# Patient Record
Sex: FEMALE | Race: WHITE | URBAN - METROPOLITAN AREA
[De-identification: names, ages, dates, MRNs, and addresses within clinical notes are randomized per-mention and may not be internally consistent; named-entity substitution may affect disease eponyms.]

---

## 2017-11-29 ENCOUNTER — HOSPITAL ENCOUNTER (OUTPATIENT)
Facility: CLINIC | Age: 57
End: 2017-11-29
Attending: OPHTHALMOLOGY | Admitting: OPHTHALMOLOGY
Payer: COMMERCIAL

## 2017-12-19 RX ORDER — PROPARACAINE HYDROCHLORIDE 5 MG/ML
1 SOLUTION/ DROPS OPHTHALMIC ONCE
Status: CANCELLED | OUTPATIENT
Start: 2017-12-19

## 2017-12-19 RX ORDER — MOXIFLOXACIN 5 MG/ML
1 SOLUTION/ DROPS OPHTHALMIC
Status: CANCELLED | OUTPATIENT
Start: 2017-12-19

## 2017-12-19 RX ORDER — DICLOFENAC SODIUM 1 MG/ML
1 SOLUTION/ DROPS OPHTHALMIC
Status: CANCELLED | OUTPATIENT
Start: 2017-12-19

## 2017-12-19 RX ORDER — PILOCARPINE HYDROCHLORIDE 10 MG/ML
1 SOLUTION/ DROPS OPHTHALMIC ONCE
Status: CANCELLED | OUTPATIENT
Start: 2017-12-19

## 2017-12-20 RX ORDER — GLUCOSAMINE/D3/BOSWELLIA SERRA 1500MG-400
1 TABLET ORAL DAILY
COMMUNITY

## 2017-12-20 RX ORDER — SILICONE ADHESIVE 1.5" X 3"
1 SHEET (EA) TOPICAL 4 TIMES DAILY PRN
COMMUNITY

## 2017-12-20 RX ORDER — MENTHOL AND ZINC OXIDE .44; 20.625 G/100G; G/100G
OINTMENT TOPICAL 3 TIMES DAILY
COMMUNITY

## 2017-12-21 ENCOUNTER — SURGERY (OUTPATIENT)
Age: 57
End: 2017-12-21

## 2017-12-21 ENCOUNTER — ANESTHESIA EVENT (OUTPATIENT)
Dept: SURGERY | Facility: CLINIC | Age: 57
End: 2017-12-21
Payer: COMMERCIAL

## 2017-12-21 ENCOUNTER — HOSPITAL ENCOUNTER (OUTPATIENT)
Facility: CLINIC | Age: 57
Discharge: HOME | End: 2017-12-21
Attending: OPHTHALMOLOGY | Admitting: OPHTHALMOLOGY
Payer: COMMERCIAL

## 2017-12-21 ENCOUNTER — ANESTHESIA (OUTPATIENT)
Dept: SURGERY | Facility: CLINIC | Age: 57
End: 2017-12-21
Payer: COMMERCIAL

## 2017-12-21 VITALS
RESPIRATION RATE: 14 BRPM | HEIGHT: 64 IN | DIASTOLIC BLOOD PRESSURE: 77 MMHG | TEMPERATURE: 97.2 F | SYSTOLIC BLOOD PRESSURE: 139 MMHG | OXYGEN SATURATION: 97 % | WEIGHT: 200 LBS

## 2017-12-21 PROCEDURE — 71000028 ZZH EYE RECOVERY PHASE 2 EACH 15 MINS: Performed by: OPHTHALMOLOGY

## 2017-12-21 PROCEDURE — 27210794 ZZH OR GENERAL SUPPLY STERILE: Performed by: OPHTHALMOLOGY

## 2017-12-21 PROCEDURE — 25000128 H RX IP 250 OP 636: Performed by: NURSE ANESTHETIST, CERTIFIED REGISTERED

## 2017-12-21 PROCEDURE — 25000125 ZZHC RX 250: Performed by: ANESTHESIOLOGY

## 2017-12-21 PROCEDURE — 25000128 H RX IP 250 OP 636: Performed by: OPHTHALMOLOGY

## 2017-12-21 PROCEDURE — 36000104 ZZH EYE SURGERY LEVEL 4 1ST 30 MIN: Performed by: OPHTHALMOLOGY

## 2017-12-21 PROCEDURE — 40000170 ZZH STATISTIC PRE-PROCEDURE ASSESSMENT II: Performed by: OPHTHALMOLOGY

## 2017-12-21 PROCEDURE — 25000125 ZZHC RX 250: Performed by: NURSE ANESTHETIST, CERTIFIED REGISTERED

## 2017-12-21 PROCEDURE — V2632 POST CHMBR INTRAOCULAR LENS: HCPCS | Performed by: OPHTHALMOLOGY

## 2017-12-21 PROCEDURE — 37000008 ZZH ANESTHESIA TECHNICAL FEE, 1ST 30 MIN: Performed by: OPHTHALMOLOGY

## 2017-12-21 PROCEDURE — 27210995 ZZH RX 272: Performed by: OPHTHALMOLOGY

## 2017-12-21 PROCEDURE — 36000105 ZZH EYE SURGERY LEVEL 4 EA 15 ADDTL MIN: Performed by: OPHTHALMOLOGY

## 2017-12-21 PROCEDURE — 25000128 H RX IP 250 OP 636: Performed by: ANESTHESIOLOGY

## 2017-12-21 PROCEDURE — V2785 CORNEAL TISSUE PROCESSING: HCPCS | Performed by: OPHTHALMOLOGY

## 2017-12-21 PROCEDURE — 37000009 ZZH ANESTHESIA TECHNICAL FEE, EACH ADDTL 15 MIN: Performed by: OPHTHALMOLOGY

## 2017-12-21 PROCEDURE — 25000125 ZZHC RX 250: Performed by: OPHTHALMOLOGY

## 2017-12-21 DEVICE — EYE CORNEA PROCESS FEE FOR DESCEMENTS MN LIONS EYE BANK: Type: IMPLANTABLE DEVICE | Site: EYE | Status: FUNCTIONAL

## 2017-12-21 DEVICE — EYE IMP IOL AMO PCL TECNIS ZCB00 17.5: Type: IMPLANTABLE DEVICE | Site: EYE | Status: FUNCTIONAL

## 2017-12-21 RX ORDER — MOXIFLOXACIN 5 MG/ML
1 SOLUTION/ DROPS OPHTHALMIC
Status: COMPLETED | OUTPATIENT
Start: 2017-12-21 | End: 2017-12-21

## 2017-12-21 RX ORDER — PROPOFOL 10 MG/ML
INJECTION, EMULSION INTRAVENOUS PRN
Status: DISCONTINUED | OUTPATIENT
Start: 2017-12-21 | End: 2017-12-21

## 2017-12-21 RX ORDER — LIDOCAINE HYDROCHLORIDE 20 MG/ML
INJECTION, SOLUTION INFILTRATION; PERINEURAL PRN
Status: DISCONTINUED | OUTPATIENT
Start: 2017-12-21 | End: 2017-12-21

## 2017-12-21 RX ORDER — TRIAMCINOLONE ACETONIDE 40 MG/ML
INJECTION, SUSPENSION INTRA-ARTICULAR; INTRAMUSCULAR PRN
Status: DISCONTINUED | OUTPATIENT
Start: 2017-12-21 | End: 2017-12-21 | Stop reason: HOSPADM

## 2017-12-21 RX ORDER — TROPICAMIDE 10 MG/ML
1 SOLUTION/ DROPS OPHTHALMIC
Status: COMPLETED | OUTPATIENT
Start: 2017-12-21 | End: 2017-12-21

## 2017-12-21 RX ORDER — SODIUM CHLORIDE, SODIUM LACTATE, POTASSIUM CHLORIDE, CALCIUM CHLORIDE 600; 310; 30; 20 MG/100ML; MG/100ML; MG/100ML; MG/100ML
INJECTION, SOLUTION INTRAVENOUS CONTINUOUS
Status: DISCONTINUED | OUTPATIENT
Start: 2017-12-21 | End: 2017-12-21 | Stop reason: HOSPADM

## 2017-12-21 RX ORDER — DICLOFENAC SODIUM 1 MG/ML
1 SOLUTION/ DROPS OPHTHALMIC
Status: COMPLETED | OUTPATIENT
Start: 2017-12-21 | End: 2017-12-21

## 2017-12-21 RX ORDER — PROPARACAINE HYDROCHLORIDE 5 MG/ML
1 SOLUTION/ DROPS OPHTHALMIC ONCE
Status: COMPLETED | OUTPATIENT
Start: 2017-12-21 | End: 2017-12-21

## 2017-12-21 RX ORDER — PILOCARPINE HYDROCHLORIDE 10 MG/ML
1 SOLUTION/ DROPS OPHTHALMIC ONCE
Status: COMPLETED | OUTPATIENT
Start: 2017-12-21 | End: 2017-12-21

## 2017-12-21 RX ORDER — BALANCED SALT SOLUTION 6.4; .75; .48; .3; 3.9; 1.7 MG/ML; MG/ML; MG/ML; MG/ML; MG/ML; MG/ML
SOLUTION OPHTHALMIC PRN
Status: DISCONTINUED | OUTPATIENT
Start: 2017-12-21 | End: 2017-12-21 | Stop reason: HOSPADM

## 2017-12-21 RX ORDER — PHENYLEPHRINE HYDROCHLORIDE 25 MG/ML
1 SOLUTION/ DROPS OPHTHALMIC
Status: COMPLETED | OUTPATIENT
Start: 2017-12-21 | End: 2017-12-21

## 2017-12-21 RX ORDER — CYCLOPENTOLATE HYDROCHLORIDE 10 MG/ML
1 SOLUTION/ DROPS OPHTHALMIC
Status: COMPLETED | OUTPATIENT
Start: 2017-12-21 | End: 2017-12-21

## 2017-12-21 RX ADMIN — PHENYLEPHRINE HYDROCHLORIDE 1 DROP: 2.5 SOLUTION/ DROPS OPHTHALMIC at 14:27

## 2017-12-21 RX ADMIN — MOXIFLOXACIN 1 DROP: 5 SOLUTION/ DROPS OPHTHALMIC at 13:45

## 2017-12-21 RX ADMIN — CEFTAZIDIME 0.5 ML: 1 INJECTION, POWDER, FOR SOLUTION INTRAMUSCULAR; INTRAVENOUS at 15:48

## 2017-12-21 RX ADMIN — CYCLOPENTOLATE HYDROCHLORIDE 1 DROP: 10 SOLUTION/ DROPS OPHTHALMIC at 14:32

## 2017-12-21 RX ADMIN — MOXIFLOXACIN 1 DROP: 5 SOLUTION/ DROPS OPHTHALMIC at 13:35

## 2017-12-21 RX ADMIN — PROPARACAINE HYDROCHLORIDE 1 DROP: 5 SOLUTION/ DROPS OPHTHALMIC at 13:29

## 2017-12-21 RX ADMIN — LIDOCAINE HYDROCHLORIDE 1 ML: 10 INJECTION, SOLUTION EPIDURAL; INFILTRATION; INTRACAUDAL; PERINEURAL at 13:48

## 2017-12-21 RX ADMIN — PILOCARPINE HYDROCHLORIDE 1 DROP: 10 SOLUTION/ DROPS OPHTHALMIC at 13:45

## 2017-12-21 RX ADMIN — PROPARACAINE HYDROCHLORIDE 1 DROP: 5 SOLUTION/ DROPS OPHTHALMIC at 14:27

## 2017-12-21 RX ADMIN — TRIAMCINOLONE ACETONIDE 20 MG: 40 INJECTION, SUSPENSION INTRA-ARTICULAR; INTRAMUSCULAR at 15:49

## 2017-12-21 RX ADMIN — DICLOFENAC SODIUM 1 DROP: 1 SOLUTION OPHTHALMIC at 13:35

## 2017-12-21 RX ADMIN — TROPICAMIDE 1 DROP: 10 SOLUTION/ DROPS OPHTHALMIC at 14:32

## 2017-12-21 RX ADMIN — PHENYLEPHRINE HYDROCHLORIDE 1 DROP: 2.5 SOLUTION/ DROPS OPHTHALMIC at 14:32

## 2017-12-21 RX ADMIN — BALANCED SALT SOLUTION 15 ML: 6.4; .75; .48; .3; 3.9; 1.7 SOLUTION OPHTHALMIC at 15:28

## 2017-12-21 RX ADMIN — Medication 5.5 MG: at 15:30

## 2017-12-21 RX ADMIN — NEOMYCIN SULFATE, POLYMYXIN B SULFATE, AND DEXAMETHASONE 1 TUBE: 3.5; 10000; 1 OINTMENT OPHTHALMIC at 16:04

## 2017-12-21 RX ADMIN — MOXIFLOXACIN 1 DROP: 5 SOLUTION/ DROPS OPHTHALMIC at 14:27

## 2017-12-21 RX ADMIN — EPINEPHRINE 500 ML: 1 INJECTION, SOLUTION, CONCENTRATE INTRAVENOUS at 15:28

## 2017-12-21 RX ADMIN — PHENYLEPHRINE HYDROCHLORIDE 1 DROP: 2.5 SOLUTION/ DROPS OPHTHALMIC at 14:43

## 2017-12-21 RX ADMIN — DICLOFENAC SODIUM 1 DROP: 1 SOLUTION OPHTHALMIC at 13:45

## 2017-12-21 RX ADMIN — CYCLOPENTOLATE HYDROCHLORIDE 1 DROP: 10 SOLUTION/ DROPS OPHTHALMIC at 14:43

## 2017-12-21 RX ADMIN — MOXIFLOXACIN 1 DROP: 5 SOLUTION/ DROPS OPHTHALMIC at 13:29

## 2017-12-21 RX ADMIN — Medication 8.5 MG: at 15:29

## 2017-12-21 RX ADMIN — MOXIFLOXACIN 1 DROP: 5 SOLUTION/ DROPS OPHTHALMIC at 14:43

## 2017-12-21 RX ADMIN — DICLOFENAC SODIUM 1 DROP: 1 SOLUTION OPHTHALMIC at 13:29

## 2017-12-21 RX ADMIN — CYCLOPENTOLATE HYDROCHLORIDE 1 DROP: 10 SOLUTION/ DROPS OPHTHALMIC at 14:27

## 2017-12-21 RX ADMIN — TROPICAMIDE 1 DROP: 10 SOLUTION/ DROPS OPHTHALMIC at 14:27

## 2017-12-21 RX ADMIN — PROPOFOL 20 MG: 10 INJECTION, EMULSION INTRAVENOUS at 15:00

## 2017-12-21 RX ADMIN — DICLOFENAC SODIUM 1 DROP: 1 SOLUTION/ DROPS OPHTHALMIC at 14:43

## 2017-12-21 RX ADMIN — MIDAZOLAM 1 MG: 1 INJECTION INTRAMUSCULAR; INTRAVENOUS at 14:58

## 2017-12-21 RX ADMIN — TROPICAMIDE 1 DROP: 10 SOLUTION/ DROPS OPHTHALMIC at 14:43

## 2017-12-21 RX ADMIN — SODIUM CHLORIDE, POTASSIUM CHLORIDE, SODIUM LACTATE AND CALCIUM CHLORIDE: 600; 310; 30; 20 INJECTION, SOLUTION INTRAVENOUS at 13:48

## 2017-12-21 RX ADMIN — LIDOCAINE HYDROCHLORIDE 40 MG: 20 INJECTION, SOLUTION INFILTRATION; PERINEURAL at 14:57

## 2017-12-21 RX ADMIN — DICLOFENAC SODIUM 1 DROP: 1 SOLUTION/ DROPS OPHTHALMIC at 14:32

## 2017-12-21 RX ADMIN — MOXIFLOXACIN 1 DROP: 5 SOLUTION/ DROPS OPHTHALMIC at 14:32

## 2017-12-21 RX ADMIN — DICLOFENAC SODIUM 1 DROP: 1 SOLUTION/ DROPS OPHTHALMIC at 14:27

## 2017-12-21 RX ADMIN — POVIDONE-IODINE 1 ML: 5 SOLUTION OPHTHALMIC at 13:29

## 2017-12-21 ASSESSMENT — ENCOUNTER SYMPTOMS
SEIZURES: 1
DYSRHYTHMIAS: 0

## 2017-12-21 NOTE — IP AVS SNAPSHOT
[facility]    [address]    [place] [address]    Phone:  [phone]    Fax:  [phone]                                       After Visit Summary   12/21/2017    Sunshine Vargas    MRN: [id number]           After Visit Summary Signature Page     I have received my discharge instructions, and my questions have been answered. I have discussed any challenges I see with this plan with the nurse or doctor.    ..........................................................................................................................................  Patient/Patient Representative Signature      ..........................................................................................................................................  Patient Representative Print Name and Relationship to Patient    ..................................................               ................................................  Date                                            Time    ..........................................................................................................................................  Reviewed by Signature/Title    ...................................................              ..............................................  Date                                                            Time
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
## 2017-12-21 NOTE — IP AVS SNAPSHOT
MRN:[id number]                      After Visit Summary   12/21/2017    Sunshine Vargas    MRN: [id number]           Thank you!     Thank you for choosing [facility] for your care. Our goal is always to provide you with excellent care. Hearing back from our patients is one way we can continue to improve our services. Please take a few minutes to complete the written survey that you may receive in the mail after you visit with us. Thank you!        Patient Information     Date Of Birth          1960        About your hospital stay     You were admitted on:  December 21, 2017 You last received care in the:  [facility]    You were discharged on:  December 21, 2017       Who to Call     For medical emergencies, please call 911.  For non-urgent questions about your medical care, please call your primary care provider or clinic, [phone]  For questions related to your surgery, please call your surgery clinic        Attending Provider     Provider Darion Fernando MD Ophthalmology       Primary Care Provider Office Phone # Fax #    Jessy Blackwell [phone]      Further instructions from your care team       [facility] Discharge  Instructions  Anesthesia ([facility])   Adult Discharge Instructions    For 24 hours after surgery    1. Get plenty of rest.  Make arrangements to have a responsible adult stay with you for at least 6 hours after you leave the hospital.  2. Do not drive or use heavy equipment for 24 hours.    3. Do not drink alcohol for 24 hours.  4. Do not sign legal documents or make important decisions for 24 hours.  5. Avoid strenuous or risky activities. You may feel lightheaded.  If so, sit for a few minutes before standing.  Have someone help you get up.   6. Conscious sedation patients may resume a regular diet..  7. Any questions of medical nature, call your physician.    Dr. DWYER  Macho  Post Operative Corneal Transplant Instructions      I. General Reminders  a. Continue any medication from your general medical doctor unless instructed otherwise.  b. Call immediately if you have any problems or questions.  II. Symptoms that often occur after surgery  a. Scratchiness  b. Foreign body sensation  III.  ALERT  symptoms - Call immediately should these occur ([phone])  a. Deep aching pain with headache and/or nausea  IV. Post-operative care  a. Your eye will patched when you leave the hospital.  Leave the patch in place until your post op appointment.  b. You will be using eye drops following surgery.  You will not start your drops until the next day, but please bring them with to your post op appointment.  V. The first couple weeks following surgery.  a. You will wear a shield at bedtime until your doctor tells you to discontinue (usually around 2 weeks).  b. Minimize eye rubbing or heavy lifting for the first week following surgery.  c. No swimming, hot tubs, or whirlpools for 2 weeks.  Showers are ok after your follow up visit the day after surgery.  d. You will typically need to be seen for follow-up and post-operative care the day after surgery, at 2 weeks, 1-2 months, and 6 months following surgery.  You will likely need visits every 6 months to 1 year.  e. It is advised that you do not receive preventive flu or pneumonia shots after corneal transplant procedures as this could lead to increased risk of corneal rejection.  VI. Additional Postoperative Instructions  a. Although you will be awake and alert in the recovery room, small amounts of anesthetic will remain in your body for at least 24 hours and you may feel tired and sleepy for the remainder of the day.  You may get up to use the restroom and for meals.  It is advisable to have someone with you at home for the remainder of the day.  b. Drink plenty of fluids.  Alcoholic beverages should be avoided for 24 hours after your  "anesthesia or intravenous sedation.  c. Prolonged nausea, vomiting, loss of vision, discharge (other than tearing), or severe pain should be reported to your doctor.  Some discomfort in the operative eye the day of surgery is normal.            Pending Results     No orders found from 2017 to 2017.            Admission Information     Date & Time Provider Department Dept. Phone    2017 Darion Pritchard MD [facility] [phone]      Your Vitals Were     Blood Pressure Temperature Respirations Height Weight Pulse Oximetry    156/82 97.2  F (36.2  C) (Temporal) 14 1.626 m (5' 4\") 90.7 kg (200 lb) 97%      MyChart Information     [facility] lets you send messages to your doctor, view your test results, renew your prescriptions, schedule appointments and more. To sign up, go to [email or web address] . Click on \"Log in\" on the left side of the screen, which will take you to the Welcome page. Then click on \"Sign up Now\" on the right side of the page.     You will be asked to enter the access code listed below, as well as some personal information. Please follow the directions to create your username and password.     Your access code is: [email or web address]  Expires: 3/21/2018  4:11 PM     Your access code will  in 90 days. If you need help or a new code, please call your [facility] clinic or [phone].        Care EveryWhere ID     This is your Care EveryWhere ID. This could be used by other organizations to access your [facility] medical records  [id number]        Equal Access to Services     [facility]: Hadpauly So, washerrellda luqadaha, qastacie choalramon awan. So [facility] [phone].    ATENCIÓN: Si habla español, tiene a rey disposición servicios gratuitos de asistencia lingüística. Llame al [phone].    We comply with applicable federal civil rights laws and [place] laws. We do not discriminate on the basis of " race, color, national origin, age, disability, sex, sexual orientation, or gender identity.               Review of your medicines      CONTINUE these medicines which have NOT CHANGED        Dose / Directions    ATORVASTATIN CALCIUM PO        Dose:  40 mg   Take 40 mg by mouth daily   Refills:  0       Biotin 53910 MCG Tabs        Dose:  1 tablet   Take 1 tablet by mouth daily   Refills:  0       CLOPIDOGREL BISULFATE PO        Dose:  75 mg   Take 75 mg by mouth daily   Refills:  0       GARCINIA CAMBOGIA-CHROMIUM PO        Dose:  2 tablet   Take 2 tablets by mouth 3 times daily   Refills:  0       GLIPIZIDE ER PO        Dose:  10 mg   Take 10 mg by mouth 2 times daily   Refills:  0       JANUVIA PO        Dose:  100 mg   Take 100 mg by mouth daily   Refills:  0       LISINOPRIL PO        Dose:  40 mg   Take 40 mg by mouth daily   Refills:  0       MAPAP PO        Dose:  500 mg   Take 500 mg by mouth once as needed for mild pain or fever   Refills:  0       menthol-zinc oxide 0.44-20.625 % Oint ointment   Commonly known as:  CALMOSEPTINE        Apply topically 3 times daily   Refills:  0       METFORMIN HCL PO        Dose:  1000 mg   Take 1,000 mg by mouth 2 times daily (with meals)   Refills:  0       METOPROLOL TARTRATE PO        Dose:  50 mg   Take 50 mg by mouth 2 times daily   Refills:  0       SERTRALINE HCL PO        Dose:  100 mg   Take 100 mg by mouth daily Take one and one half tablets daily   Refills:  0       sodium chloride 5 % ophthalmic solution   Commonly known as:  ROBERTA 128        Dose:  1 drop   1 drop 4 times daily as needed for dry eyes   Refills:  0       TRAZODONE HCL PO        Dose:  50 mg   Take 50 mg by mouth nightly as needed for sleep   Refills:  0       VITAMIN D (CHOLECALCIFEROL) PO        Dose:  2000 Units   Take 2,000 Units by mouth daily   Refills:  0                Protect others around you: Learn how to safely use, store and throw away your medicines at www.disposemymeds.org.              Medication List: This is a list of all your medications and when to take them. Check marks below indicate your daily home schedule. Keep this list as a reference.      Medications           Morning Afternoon Evening Bedtime As Needed    ATORVASTATIN CALCIUM PO   Take 40 mg by mouth daily                                Biotin 78070 MCG Tabs   Take 1 tablet by mouth daily                                CLOPIDOGREL BISULFATE PO   Take 75 mg by mouth daily                                GARCINIA CAMBOGIA-CHROMIUM PO   Take 2 tablets by mouth 3 times daily                                GLIPIZIDE ER PO   Take 10 mg by mouth 2 times daily                                JANUVIA PO   Take 100 mg by mouth daily                                LISINOPRIL PO   Take 40 mg by mouth daily                                MAPAP PO   Take 500 mg by mouth once as needed for mild pain or fever                                menthol-zinc oxide 0.44-20.625 % Oint ointment   Commonly known as:  CALMOSEPTINE   Apply topically 3 times daily                                METFORMIN HCL PO   Take 1,000 mg by mouth 2 times daily (with meals)                                METOPROLOL TARTRATE PO   Take 50 mg by mouth 2 times daily                                SERTRALINE HCL PO   Take 100 mg by mouth daily Take one and one half tablets daily                                sodium chloride 5 % ophthalmic solution   Commonly known as:  ROBERTA 128   1 drop 4 times daily as needed for dry eyes                                TRAZODONE HCL PO   Take 50 mg by mouth nightly as needed for sleep                                VITAMIN D (CHOLECALCIFEROL) PO   Take 2,000 Units by mouth daily

## 2017-12-21 NOTE — DISCHARGE INSTRUCTIONS
[facility] Discharge  Instructions  Anesthesia ([facility])   Adult Discharge Instructions    For 24 hours after surgery    1. Get plenty of rest.  Make arrangements to have a responsible adult stay with you for at least 6 hours after you leave the hospital.  2. Do not drive or use heavy equipment for 24 hours.    3. Do not drink alcohol for 24 hours.  4. Do not sign legal documents or make important decisions for 24 hours.  5. Avoid strenuous or risky activities. You may feel lightheaded.  If so, sit for a few minutes before standing.  Have someone help you get up.   6. Conscious sedation patients may resume a regular diet..  7. Any questions of medical nature, call your physician.    Dr. YULIYA Pritchard  Post Operative Corneal Transplant Instructions      I. General Reminders  a. Continue any medication from your general medical doctor unless instructed otherwise.  b. Call immediately if you have any problems or questions.  II. Symptoms that often occur after surgery  a. Scratchiness  b. Foreign body sensation  III.  ALERT  symptoms - Call immediately should these occur ([phone])  a. Deep aching pain with headache and/or nausea  IV. Post-operative care  a. Your eye will patched when you leave the hospital.  Leave the patch in place until your post op appointment.  b. You will be using eye drops following surgery.  You will not start your drops until the next day, but please bring them with to your post op appointment.  V. The first couple weeks following surgery.  a. You will wear a shield at bedtime until your doctor tells you to discontinue (usually around 2 weeks).  b. Minimize eye rubbing or heavy lifting for the first week following surgery.  c. No swimming, hot tubs, or whirlpools for 2 weeks.  Showers are ok after your follow up visit the day after surgery.  d. You will typically need to be seen for follow-up and post-operative care the day after surgery, at 2 weeks,  1-2 months, and 6 months following surgery.  You will likely need visits every 6 months to 1 year.  e. It is advised that you do not receive preventive flu or pneumonia shots after corneal transplant procedures as this could lead to increased risk of corneal rejection.  VI. Additional Postoperative Instructions  a. Although you will be awake and alert in the recovery room, small amounts of anesthetic will remain in your body for at least 24 hours and you may feel tired and sleepy for the remainder of the day.  You may get up to use the restroom and for meals.  It is advisable to have someone with you at home for the remainder of the day.  b. Drink plenty of fluids.  Alcoholic beverages should be avoided for 24 hours after your anesthesia or intravenous sedation.  c. Prolonged nausea, vomiting, loss of vision, discharge (other than tearing), or severe pain should be reported to your doctor.  Some discomfort in the operative eye the day of surgery is normal.

## 2017-12-21 NOTE — ANESTHESIA CARE TRANSFER NOTE
Patient: Sunshine Vargas    Procedure(s):  LEFT EYE KERATOPLASTY LAMELLAR DESCEMET'S STRIPPING, LEFT EYE PHACOEMULSIFICATION CLEAR CORNEA WITH STANDARD INTRAOCULAR LENS IMPLANT  AND   IRIDOTOMY - Wound Class: I-Clean   - Wound Class: I-Clean    Diagnosis: ENDOTHELIAL CORNEAL DYSTROPHY AND CATARACT LEFT EYE   Diagnosis Additional Information: No value filed.    Anesthesia Type:   MAC     Note:  Airway :Room Air  Patient transferred to:PACU  Handoff Report: Identifed the Patient, Identified the Reponsible Provider, Reviewed the pertinent medical history, Discussed the surgical course, Reviewed Intra-OP anesthesia mangement and issues during anesthesia, Set expectations for post-procedure period and Allowed opportunity for questions and acknowledgement of understanding      Vitals: (Last set prior to Anesthesia Care Transfer)    CRNA VITALS  12/21/2017 1527 - 12/21/2017 1601      12/21/2017             Pulse: 75    Ht Rate: 75    SpO2: 98 %    Resp Rate (set): 10                Electronically Signed By: LEANDRO Adkins CRNA  December 21, 2017  4:01 PM

## 2017-12-21 NOTE — BRIEF OP NOTE
[facility] Brief Operative Note    Pre-operative diagnosis: ENDOTHELIAL CORNEAL DYSTROPHY AND CATARACT LEFT EYE    Post-operative diagnosis cataract, fuchs     Procedure: Procedure(s):  LEFT EYE KERATOPLASTY LAMELLAR DESCEMET'S STRIPPING, LEFT EYE PHACOEMULSIFICATION CLEAR CORNEA WITH STANDARD INTRAOCULAR LENS IMPLANT  AND   IRIDOTOMY - Wound Class: I-Clean   - Wound Class: I-Clean   Surgeon(s): Surgeon(s) and Role:     * Darion Pritchard MD - Primary   Estimated blood loss: * No values recorded between 12/21/2017  3:10 PM and 12/21/2017  3:59 PM *    Specimens: * No specimens in log *   Findings: 8.0mm graft using endoserter, surgical Inferior PI, Resure sealant used.

## 2017-12-22 NOTE — ANESTHESIA POSTPROCEDURE EVALUATION
Patient: Sunshine Vargas    Procedure(s):  LEFT EYE KERATOPLASTY LAMELLAR DESCEMET'S STRIPPING, LEFT EYE PHACOEMULSIFICATION CLEAR CORNEA WITH STANDARD INTRAOCULAR LENS IMPLANT  AND   IRIDOTOMY - Wound Class: I-Clean   - Wound Class: I-Clean   - Wound Class: I-Clean    Diagnosis:ENDOTHELIAL CORNEAL DYSTROPHY AND CATARACT LEFT EYE   Diagnosis Additional Information: No value filed.    Anesthesia Type:  MAC    Note:  Anesthesia Post Evaluation    Patient location during evaluation: PACU  Patient participation: Able to fully participate in evaluation  Level of consciousness: awake  Pain management: adequate  Airway patency: patent  Cardiovascular status: acceptable  Hydration status: acceptable  PONV: none     Anesthetic complications: None          Last vitals:  Vitals:    12/21/17 1333 12/21/17 1600 12/21/17 1651   BP: 147/80 156/82 139/77   Resp: 16 14 14   Temp: 36.2  C (97.2  F)     SpO2: 98% 97% 97%         Electronically Signed By: Scotty Timmons MD  December 21, 2017  6:45 PM

## 2017-12-22 NOTE — OP NOTE
DATE OF PROCEDURE:  21/21/2017      PREOPERATIVE DIAGNOSES:   1.  Cataract.     2.  Fuchs corneal dystrophy.        POSTOPERATIVE DIAGNOSES:   1.  Cataract.     2.  Fuchs corneal dystrophy.        PROCEDURE:  Descemet stripping endothelial keratoplasty using 8.0 mm graft via EndoSerter, phacoemulsification with posterior chamber intraocular lens implantation using +17.5 diopter ZCB00 lens, surgical inferior iridotomy, left eye.      SURGEON:  Darion Pritchard MD      ANESTHESIA:  Retrobulbar plus IV sedation.      COMPLICATIONS:  None.      INDICATIONS FOR PROCEDURE:  Ms. Sunshine Vargas was referred to me by Dr. De Leon for severe Fuchs dystrophy and cataract.  She has asked to proceed with a combined cataract surgery and corneal transplantation using an endothelial keratoplasty technique.  She understands the risks and benefits of surgery.  She has great difficulty with mobility and for this reason, the suture will not be used.  Instead, we will use ReSure sealant.      DESCRIPTION OF PROCEDURE:  After informed consent was obtained, the patient was given a retrobulbar block using a mixture of lidocaine, Wydase and Marcaine.  The patient was then sterilely prepped and draped.  A lid speculum was placed.  The cornea was marked using a marking pen to becki the paracentesis sites.  An 8.8 mm optical zone marker was then used to becki a central [place] on the cornea.  This was reinforced with a marking pen.  A paracentesis was made.  Viscoelastic was instilled in the anterior chamber.  Two additional paracentesis sites were made.  A 2.5 mm keratome was used to enter the anterior chamber.  The capsulorrhexis was performed using the Utrata forceps.  The eye was then hydrodissected.  The lens nucleus was then removed using a supracapsular technique.  The cortex was aspirated using automated irrigation and aspiration.  The posterior capsule was intact.  A ZCB00, +17.5 diopter lens was then inserted into the capsular bag.  The Descemet  membrane was then removed using a reversed Sinskey hook and a Descemet stripper.  The underlying stroma was then scored using a Christos scraper.  A reverse Sinskey hook and MST scissors were then used to create a small peripheral iridotomy inferiorly.  The donor was prepared using an 8.0 mm Guerrero press.  It was loaded into an EndoSerter then inserted into the anterior chamber.  A temporary suture was placed on the cornea.  The graft was centered and then locked into place using 100% air bubble for 10 minutes.  At the end of 10 minutes ReSure sealant was used and allowed to rest for another minute or two.  The suture was removed and additional sealant was placed.  The air bubble was then exchanged for an 80% air bubble.  The patient tolerated the procedure well.  Injections of Kenalog and ceftazidime were administered.  The eye was then covered with Maxitrol ointment, a sterile patch and De Leon shield.  There were no complications.         JOANA SANTACRUZ MD             D: 2017 16:06   T: 2017 02:02   MT: EDUIN#[id number]      Name:     MARY JANE RHODES   MRN:      [id number]        Account:        [id number]   :      1960           Procedure Date: 2017      Document: [id number]

## 2018-01-25 ENCOUNTER — HOSPITAL ENCOUNTER (OUTPATIENT)
Facility: CLINIC | Age: 58
End: 2018-01-25
Attending: OPHTHALMOLOGY | Admitting: OPHTHALMOLOGY
Payer: COMMERCIAL

## 2018-01-26 ENCOUNTER — TRANSFERRED RECORDS (OUTPATIENT)
Dept: HEALTH INFORMATION MANAGEMENT | Facility: CLINIC | Age: 58
End: 2018-01-26

## 2018-01-29 RX ORDER — PILOCARPINE HYDROCHLORIDE 10 MG/ML
1 SOLUTION/ DROPS OPHTHALMIC ONCE
Status: CANCELLED | OUTPATIENT
Start: 2018-01-29 | End: 2018-01-29

## 2018-02-01 RX ORDER — PREDNISOLONE ACETATE 10 MG/ML
1 SUSPENSION/ DROPS OPHTHALMIC 4 TIMES DAILY
Status: ON HOLD | COMMUNITY
End: 2018-02-06 | Stop reason: HOSPADM

## 2018-02-06 ENCOUNTER — HOSPITAL ENCOUNTER (OUTPATIENT)
Facility: CLINIC | Age: 58
Discharge: HOME | End: 2018-02-06
Attending: OPHTHALMOLOGY | Admitting: OPHTHALMOLOGY
Payer: COMMERCIAL

## 2018-02-06 ENCOUNTER — SURGERY (OUTPATIENT)
Age: 58
End: 2018-02-06

## 2018-02-06 ENCOUNTER — ANESTHESIA (OUTPATIENT)
Dept: SURGERY | Facility: CLINIC | Age: 58
End: 2018-02-06
Payer: COMMERCIAL

## 2018-02-06 ENCOUNTER — ANESTHESIA EVENT (OUTPATIENT)
Dept: SURGERY | Facility: CLINIC | Age: 58
End: 2018-02-06
Payer: COMMERCIAL

## 2018-02-06 VITALS
HEIGHT: 64 IN | DIASTOLIC BLOOD PRESSURE: 92 MMHG | BODY MASS INDEX: 33.63 KG/M2 | SYSTOLIC BLOOD PRESSURE: 125 MMHG | WEIGHT: 197 LBS | TEMPERATURE: 97 F | RESPIRATION RATE: 12 BRPM | HEART RATE: 75 BPM | OXYGEN SATURATION: 98 %

## 2018-02-06 LAB
GLUCOSE BLDC GLUCOMTR-MCNC: 118 MG/DL (ref 70–99)
GLUCOSE BLDC GLUCOMTR-MCNC: 146 MG/DL (ref 70–99)

## 2018-02-06 PROCEDURE — 36000105 ZZH EYE SURGERY LEVEL 4 EA 15 ADDTL MIN: Performed by: OPHTHALMOLOGY

## 2018-02-06 PROCEDURE — 37000008 ZZH ANESTHESIA TECHNICAL FEE, 1ST 30 MIN: Performed by: OPHTHALMOLOGY

## 2018-02-06 PROCEDURE — 36000104 ZZH EYE SURGERY LEVEL 4 1ST 30 MIN: Performed by: OPHTHALMOLOGY

## 2018-02-06 PROCEDURE — 40000170 ZZH STATISTIC PRE-PROCEDURE ASSESSMENT II: Performed by: OPHTHALMOLOGY

## 2018-02-06 PROCEDURE — 27210794 ZZH OR GENERAL SUPPLY STERILE: Performed by: OPHTHALMOLOGY

## 2018-02-06 PROCEDURE — 25000128 H RX IP 250 OP 636: Performed by: OPHTHALMOLOGY

## 2018-02-06 PROCEDURE — 71000028 ZZH EYE RECOVERY PHASE 2 EACH 15 MINS: Performed by: OPHTHALMOLOGY

## 2018-02-06 PROCEDURE — 25000125 ZZHC RX 250: Performed by: NURSE ANESTHETIST, CERTIFIED REGISTERED

## 2018-02-06 PROCEDURE — 25000128 H RX IP 250 OP 636: Performed by: ANESTHESIOLOGY

## 2018-02-06 PROCEDURE — 82962 GLUCOSE BLOOD TEST: CPT

## 2018-02-06 PROCEDURE — V2632 POST CHMBR INTRAOCULAR LENS: HCPCS | Performed by: OPHTHALMOLOGY

## 2018-02-06 PROCEDURE — V2785 CORNEAL TISSUE PROCESSING: HCPCS | Performed by: OPHTHALMOLOGY

## 2018-02-06 PROCEDURE — 37000009 ZZH ANESTHESIA TECHNICAL FEE, EACH ADDTL 15 MIN: Performed by: OPHTHALMOLOGY

## 2018-02-06 PROCEDURE — 25000125 ZZHC RX 250: Performed by: OPHTHALMOLOGY

## 2018-02-06 PROCEDURE — 25000128 H RX IP 250 OP 636: Performed by: NURSE ANESTHETIST, CERTIFIED REGISTERED

## 2018-02-06 PROCEDURE — 66761 REVISION OF IRIS: CPT | Performed by: OPHTHALMOLOGY

## 2018-02-06 PROCEDURE — 25000125 ZZHC RX 250: Performed by: ANESTHESIOLOGY

## 2018-02-06 DEVICE — EYE IMP IOL AMO PCL TECNIS ZCB00 16.5: Type: IMPLANTABLE DEVICE | Site: EYE | Status: FUNCTIONAL

## 2018-02-06 DEVICE — EYE CORNEA PROCESS FEE FOR DESCEMENTS MN LIONS EYE BANK: Type: IMPLANTABLE DEVICE | Site: EYE | Status: FUNCTIONAL

## 2018-02-06 RX ORDER — SODIUM CHLORIDE, SODIUM LACTATE, POTASSIUM CHLORIDE, CALCIUM CHLORIDE 600; 310; 30; 20 MG/100ML; MG/100ML; MG/100ML; MG/100ML
INJECTION, SOLUTION INTRAVENOUS CONTINUOUS
Status: DISCONTINUED | OUTPATIENT
Start: 2018-02-06 | End: 2018-02-06 | Stop reason: HOSPADM

## 2018-02-06 RX ORDER — PROPARACAINE HYDROCHLORIDE 5 MG/ML
1 SOLUTION/ DROPS OPHTHALMIC ONCE
Status: COMPLETED | OUTPATIENT
Start: 2018-02-06 | End: 2018-02-06

## 2018-02-06 RX ORDER — ONDANSETRON 2 MG/ML
INJECTION INTRAMUSCULAR; INTRAVENOUS PRN
Status: DISCONTINUED | OUTPATIENT
Start: 2018-02-06 | End: 2018-02-06

## 2018-02-06 RX ORDER — PHENYLEPHRINE HYDROCHLORIDE 25 MG/ML
1 SOLUTION/ DROPS OPHTHALMIC
Status: COMPLETED | OUTPATIENT
Start: 2018-02-06 | End: 2018-02-06

## 2018-02-06 RX ORDER — TRIAMCINOLONE ACETONIDE 40 MG/ML
INJECTION, SUSPENSION INTRA-ARTICULAR; INTRAMUSCULAR PRN
Status: DISCONTINUED | OUTPATIENT
Start: 2018-02-06 | End: 2018-02-06 | Stop reason: HOSPADM

## 2018-02-06 RX ORDER — FENTANYL CITRATE 50 UG/ML
INJECTION, SOLUTION INTRAMUSCULAR; INTRAVENOUS PRN
Status: DISCONTINUED | OUTPATIENT
Start: 2018-02-06 | End: 2018-02-06

## 2018-02-06 RX ORDER — MOXIFLOXACIN 5 MG/ML
1 SOLUTION/ DROPS OPHTHALMIC
Status: COMPLETED | OUTPATIENT
Start: 2018-02-06 | End: 2018-02-06

## 2018-02-06 RX ORDER — BALANCED SALT SOLUTION 6.4; .75; .48; .3; 3.9; 1.7 MG/ML; MG/ML; MG/ML; MG/ML; MG/ML; MG/ML
SOLUTION OPHTHALMIC PRN
Status: DISCONTINUED | OUTPATIENT
Start: 2018-02-06 | End: 2018-02-06 | Stop reason: HOSPADM

## 2018-02-06 RX ORDER — TROPICAMIDE 10 MG/ML
1 SOLUTION/ DROPS OPHTHALMIC
Status: COMPLETED | OUTPATIENT
Start: 2018-02-06 | End: 2018-02-06

## 2018-02-06 RX ORDER — PROPOFOL 10 MG/ML
INJECTION, EMULSION INTRAVENOUS PRN
Status: DISCONTINUED | OUTPATIENT
Start: 2018-02-06 | End: 2018-02-06

## 2018-02-06 RX ORDER — DICLOFENAC SODIUM 1 MG/ML
1 SOLUTION/ DROPS OPHTHALMIC
Status: COMPLETED | OUTPATIENT
Start: 2018-02-06 | End: 2018-02-06

## 2018-02-06 RX ADMIN — DEXMEDETOMIDINE HYDROCHLORIDE 8 MCG: 100 INJECTION, SOLUTION INTRAVENOUS at 13:41

## 2018-02-06 RX ADMIN — DICLOFENAC SODIUM 1 DROP: 1 SOLUTION/ DROPS OPHTHALMIC at 12:30

## 2018-02-06 RX ADMIN — PROPARACAINE HYDROCHLORIDE 1 DROP: 5 SOLUTION/ DROPS OPHTHALMIC at 12:25

## 2018-02-06 RX ADMIN — PROPOFOL 35 MG: 10 INJECTION, EMULSION INTRAVENOUS at 13:41

## 2018-02-06 RX ADMIN — TRIAMCINOLONE ACETONIDE 40 MG: 40 INJECTION, SUSPENSION INTRA-ARTICULAR; INTRAMUSCULAR at 14:43

## 2018-02-06 RX ADMIN — LIDOCAINE HYDROCHLORIDE 2 ML: 10 INJECTION, SOLUTION EPIDURAL; INFILTRATION; INTRACAUDAL; PERINEURAL at 13:27

## 2018-02-06 RX ADMIN — PHENYLEPHRINE HYDROCHLORIDE 1 DROP: 2.5 SOLUTION/ DROPS OPHTHALMIC at 12:30

## 2018-02-06 RX ADMIN — Medication 0.1 ML: at 14:42

## 2018-02-06 RX ADMIN — DICLOFENAC SODIUM 1 DROP: 1 SOLUTION/ DROPS OPHTHALMIC at 12:37

## 2018-02-06 RX ADMIN — PHENYLEPHRINE HYDROCHLORIDE 1 DROP: 2.5 SOLUTION/ DROPS OPHTHALMIC at 12:25

## 2018-02-06 RX ADMIN — FENTANYL CITRATE 25 MCG: 50 INJECTION, SOLUTION INTRAMUSCULAR; INTRAVENOUS at 13:41

## 2018-02-06 RX ADMIN — TROPICAMIDE 1 DROP: 10 SOLUTION/ DROPS OPHTHALMIC at 12:36

## 2018-02-06 RX ADMIN — MIDAZOLAM 2 MG: 1 INJECTION INTRAMUSCULAR; INTRAVENOUS at 13:39

## 2018-02-06 RX ADMIN — Medication 5.5 MG: at 14:44

## 2018-02-06 RX ADMIN — TROPICAMIDE 1 DROP: 10 SOLUTION/ DROPS OPHTHALMIC at 12:30

## 2018-02-06 RX ADMIN — DEXMEDETOMIDINE HYDROCHLORIDE 8 MCG: 100 INJECTION, SOLUTION INTRAVENOUS at 14:10

## 2018-02-06 RX ADMIN — DICLOFENAC SODIUM 1 DROP: 1 SOLUTION/ DROPS OPHTHALMIC at 12:25

## 2018-02-06 RX ADMIN — TROPICAMIDE 1 DROP: 10 SOLUTION/ DROPS OPHTHALMIC at 12:25

## 2018-02-06 RX ADMIN — MOXIFLOXACIN 1 DROP: 5 SOLUTION/ DROPS OPHTHALMIC at 12:25

## 2018-02-06 RX ADMIN — Medication 5.5 MG: at 14:42

## 2018-02-06 RX ADMIN — BALANCED SALT SOLUTION 15 ML: 6.4; .75; .48; .3; 3.9; 1.7 SOLUTION OPHTHALMIC at 14:42

## 2018-02-06 RX ADMIN — POVIDONE-IODINE 1 ML: 5 SOLUTION OPHTHALMIC at 12:25

## 2018-02-06 RX ADMIN — SODIUM CHLORIDE, POTASSIUM CHLORIDE, SODIUM LACTATE AND CALCIUM CHLORIDE: 600; 310; 30; 20 INJECTION, SOLUTION INTRAVENOUS at 13:27

## 2018-02-06 RX ADMIN — DEXMEDETOMIDINE HYDROCHLORIDE 8 MCG: 100 INJECTION, SOLUTION INTRAVENOUS at 14:13

## 2018-02-06 RX ADMIN — ONDANSETRON 4 MG: 2 INJECTION INTRAMUSCULAR; INTRAVENOUS at 13:50

## 2018-02-06 RX ADMIN — PHENYLEPHRINE HYDROCHLORIDE 1 DROP: 2.5 SOLUTION/ DROPS OPHTHALMIC at 12:37

## 2018-02-06 RX ADMIN — MOXIFLOXACIN 1 DROP: 5 SOLUTION/ DROPS OPHTHALMIC at 12:30

## 2018-02-06 RX ADMIN — MOXIFLOXACIN 1 DROP: 5 SOLUTION/ DROPS OPHTHALMIC at 12:37

## 2018-02-06 ASSESSMENT — ENCOUNTER SYMPTOMS
DYSRHYTHMIAS: 0
SEIZURES: 1

## 2018-02-06 NOTE — ANESTHESIA POSTPROCEDURE EVALUATION
Patient: Sunshine Vargas    Procedure(s):  RIGHT EYE IRRIDOTOMY ; PHACOEMULSIFICATION CLEAR CORNEA WITH STANDARD INTRAOCULAR LENS IMPLANT WITH DESECT  ( MAC RETROBULBAR)  - Wound Class: I-Clean    Diagnosis:FUCHS DYSTROPHY ; CATARACT   Diagnosis Additional Information: No value filed.    Anesthesia Type:  MAC    Note:  Anesthesia Post Evaluation    Patient location during evaluation: PACU  Patient participation: Able to fully participate in evaluation  Level of consciousness: awake and alert  Pain management: adequate  Airway patency: patent  Cardiovascular status: acceptable  Respiratory status: acceptable  Hydration status: acceptable  PONV: none     Anesthetic complications: None          Last vitals:  Vitals:    02/06/18 1240 02/06/18 1445 02/06/18 1540   BP: 137/86 122/75 (!) 125/92   Pulse: 75     Resp: 16 12 12   Temp: 36.1  C (97  F)     SpO2: 100% 98% 98%         Electronically Signed By: Dean Epps MD  February 6, 2018  4:59 PM

## 2018-02-06 NOTE — DISCHARGE INSTRUCTIONS
[facility] Anesthesia [facility] Discharge  Instructions  Anesthesia ([facility])   Adult Discharge Instructions    For 24 hours after surgery    1. Get plenty of rest.  Make arrangements to have a responsible adult stay with you for at least 6 hours after you leave the hospital.  2. Do not drive or use heavy equipment for 24 hours.    3. Do not drink alcohol for 24 hours.  4. Do not sign legal documents or make important decisions for 24 hours.  5. Avoid strenuous or risky activities. You may feel lightheaded.  If so, sit for a few minutes before standing.  Have someone help you get up.   6. Conscious sedation patients may resume a regular diet..  7. Any questions of medical nature, call your physician.    Dr. YULIYA Pritchard  Post Operative DSEK Instructions      AFTER DSEK SURGERY INSTRUCTIONS    I. General Reminders  a. Continue any medication from your general medical doctor unless instructed otherwise.  b. Call immediately if you have any problems or questions.  II. Symptoms that often occur after surgery  a. Scratchiness  b. Foreign body sensation  III.  ALERT  symptoms - Call immediately should these occur ([phone])  a. Deep aching pain with headache and/or nausea  IV. Post-operative care  a. Lie flat in bed until your follow up visit the day after surgery.  b. You will be using eye drops following surgery.  The nurse will explain them and there will also be instructions on the bottles.  V. The first couple weeks following surgery.  a. You will wear a shield at bedtime until your doctor tells you to discontinue (usually around 2 weeks).  b. Minimize eye rubbing or heavy lifting for the first week following surgery.  c. No swimming, hot tubs, or whirlpools for 2 weeks.  Showers are ok after your follow up visit the day after surgery.  d. You will typically need to be seen for follow-up and post-operative care the day after surgery, at 2 weeks, 1-2 months, and 6 months following surgery.   You will likely need visits every 6 months to 1 year.  e. It is advised that you do not receive preventive flu or pneumonia shots after corneal transplant procedures as this could lead to increased risk of corneal rejection.  VI. Additional Postoperative Instructions  a. Although you will be awake and alert in the recovery room, small amounts of anesthetic will remain in your body for at least 24 hours and you may feel tired and sleepy for the remainder of the day.  You may get up to use the restroom and for meals.  It is advisable to have someone with you at home for the remainder of the day.  b. Drink plenty of fluids.  Alcoholic beverages should be avoided for 24 hours after your anesthesia or intravenous sedation.  c. Prolonged nausea, vomiting, loss of vision, discharge (other than tearing), or severe pain should be reported to your doctor.  Some discomfort in the operative eye the day of surgery is normal.

## 2018-02-06 NOTE — IP AVS SNAPSHOT
[facility]    [address]    [place] [address]    Phone:  [phone]    Fax:  [phone]                                       After Visit Summary   2/6/2018    Sunshine Vargas    MRN: [id number]           After Visit Summary Signature Page     I have received my discharge instructions, and my questions have been answered. I have discussed any challenges I see with this plan with the nurse or doctor.    ..........................................................................................................................................  Patient/Patient Representative Signature      ..........................................................................................................................................  Patient Representative Print Name and Relationship to Patient    ..................................................               ................................................  Date                                            Time    ..........................................................................................................................................  Reviewed by Signature/Title    ...................................................              ..............................................  Date                                                            Time

## 2018-02-06 NOTE — OP NOTE
Procedure Date: 02/06/2018      PREOPERATIVE DIAGNOSES:   1.  Fuchs dystrophy, right eye.   2.  Visually significant cataract, right eye.      POSTOPERATIVE DIAGNOSES:   1.  Fuchs dystrophy, right eye.   2.  Visually significant cataract, right eye.      PROCEDURE:  Descemet stripping endothelial keratoplasty, inferior peripheral iridotomy, phacoemulsification cataract extraction, posterior chamber lens implantation, right eye.      SURGEON:  Darion Pritchard MD       ANESTHESIA:  Retrobulbar plus IV sedation.      COMPLICATIONS:  None.      INDICATIONS FOR PROCEDURE:  Mrs. Vargas is a patient with history of bilateral cataracts and Fuchs dystrophy.  She has undergone successful surgery on her left eye and now wishes to obtain a similar result in her right eye.  She understands the risks and benefits of surgery including loss of vision, loss of the eye, hemorrhage, infection and other rare possible side effects.      DESCRIPTION OF PROCEDURE:  After informed consent was obtained, the patient was given a retrobulbar block using a mixture of Lidocaine, Wydase and Marcaine.  She was sterilely prepped and draped.  A lid speculum was placed.  The cornea was marked at the site of 3 different paracenteses locations.  An 8.2 mm [place] was then marked on the cornea.  An 8.25 mm donor tissue was trephined from the precut corneal tissue from the eye bank.  It was covered in balanced salt saline and set aside for later use.  Paracentesis sites were then made in the patient's cornea at 3 different locations.  Healon was instilled.  A 2.4 mm incision was then created.  Continuous curvilinear capsulorrhexis was performed.  Phacoemulsification was performed using a phacoemulsification aspiration technique.  The cortex was aspirated.  The posterior capsule was intact.  A ZCB00 +16.5 diopter lens was then inserted into the capsular bag.  Descemet membrane was stripped using a Descemet reverse Sinskey hook.  The tissue was then stripped  using a Descemet stripper.  It was harvested using Utrata forceps.  The stroma was roughened slightly with a Christos scraper.  The wound was enlarged to 4.1 mm.  Viscoelastic was aspirated.  The donor cornea was loaded into an EndoSerter.  The tissue was then inserted into the eye with proper orientation.  It was centered and then an air bubble was placed for 100% fill.  It was left to rest for 10 minutes.  At the end of 10 minutes the air bubble was exchanged for 70% air fill using balanced salt saline.  The tissue appeared to be perfectly centered and well-adhered.  Injections of Kenalog and cefuroxime were administered subconjunctivally.  A bandage contact lens was placed.  This was followed by sterile eye patch and [place] shield.  The patient was allowed to rest in a face-up position in the postoperative area.  There were no complications.         JOANA SANTACRUZ MD             D: 2018   T: 2018   MT: [id number]      Name:     MARY JANE RHODES   MRN:      [id number]        Account:        [id number]   :      1960           Procedure Date: 2018      Document: [id number]

## 2018-02-06 NOTE — ANESTHESIA PREPROCEDURE EVALUATION
Anesthesia Evaluation     . Pt has had prior anesthetic.     No history of anesthetic complications          ROS/MED HX    ENT/Pulmonary:     (+)ARTURO risk factors snores loudly, hypertension, obese, allergic rhinitis, , . .   (-) recent URI   Neurologic:     (+)neuropathy seizures (one as a child and one in the 90's) CVA (left hemiplegia and foot drop) with deficits    Cardiovascular: Comment: TTE (2/2016) post CVA EF 55- 60% with apical wall motion abnormality    (+) Dyslipidemia, hypertension--CAD, -past MI (inferior MI),-. : . . . :. .      (-) arrhythmias   METS/Exercise Tolerance:     Hematologic:         Musculoskeletal:         GI/Hepatic:     (+) GERD      (-) liver disease   Renal/Genitourinary:  - ROS Renal section negative       Endo:     (+) type II DM Diabetic complications: neuropathy retinopathy, Obesity, .   (-) thyroid disease   Psychiatric: Comment: insomnia    (+) psychiatric history depression      Infectious Disease:         Malignancy:         Other: Comment: Chronic fatique                    Physical Exam  Normal systems: cardiovascular, pulmonary and dental    Airway   Mallampati: III  TM distance: >3 FB  Neck ROM: full    Dental     Cardiovascular       Pulmonary                     Anesthesia Plan      History & Physical Review  History and physical reviewed and following examination; no interval change.    ASA Status:  3 .    NPO Status:  > 8 hours    Plan for MAC Reason for MAC:  Procedure to face, neck, head or breast  PONV prophylaxis:  Ondansetron (or other 5HT-3)       Postoperative Care  Postoperative pain management:  IV analgesics.      Consents  Anesthetic plan, risks, benefits and alternatives discussed with:  Patient..                          .

## 2018-02-06 NOTE — BRIEF OP NOTE
[facility] Brief Operative Note    Pre-operative diagnosis: FUCHS DYSTROPHY ; CATARACT    Post-operative diagnosis Fuchs, cataract   Procedure: Procedure(s):  RIGHT EYE IRRIDOTOMY ; PHACOEMULSIFICATION CLEAR CORNEA WITH STANDARD INTRAOCULAR LENS IMPLANT WITH DESECT  ( MAC RETROBULBAR)  - Wound Class: I-Clean   Surgeon(s): Surgeon(s) and Role:     * Darion Pritchard MD - Primary   Estimated blood loss: * No values recorded between 2/6/2018  1:53 PM and 2/6/2018  2:42 PM *    Specimens: * No specimens in log *   Findings: IFIS, small pupil

## 2018-02-06 NOTE — ANESTHESIA CARE TRANSFER NOTE
Patient: Sunshine Vargas    Procedure(s):  RIGHT EYE IRRIDOTOMY ; PHACOEMULSIFICATION CLEAR CORNEA WITH STANDARD INTRAOCULAR LENS IMPLANT WITH DESECT  ( MAC RETROBULBAR)  - Wound Class: I-Clean    Diagnosis: FUCHS DYSTROPHY ; CATARACT   Diagnosis Additional Information: No value filed.    Anesthesia Type:   MAC     Note:  Airway :Room Air  Patient transferred to:Phase II  Comments: Patient alert and appropriate. VSS. Report given to RN.Handoff Report: Identifed the Patient, Identified the Reponsible Provider, Reviewed the pertinent medical history, Discussed the surgical course, Reviewed Intra-OP anesthesia mangement and issues during anesthesia, Set expectations for post-procedure period and Allowed opportunity for questions and acknowledgement of understanding      Vitals: (Last set prior to Anesthesia Care Transfer)    CRNA VITALS  2/6/2018 1412 - 2/6/2018 1447      2/6/2018             SpO2: 98 %    Resp Rate (set): 10                Electronically Signed By: LEANDRO Flores CRNA  February 6, 2018  2:47 PM

## 2018-02-06 NOTE — IP AVS SNAPSHOT
MRN:[id number]                      After Visit Summary   2/6/2018    Sunshine Vargas    MRN: [id number]           Thank you!     Thank you for choosing [facility] for your care. Our goal is always to provide you with excellent care. Hearing back from our patients is one way we can continue to improve our services. Please take a few minutes to complete the written survey that you may receive in the mail after you visit with us. Thank you!        Patient Information     Date Of Birth          1960        About your hospital stay     You were admitted on:  February 6, 2018 You last received care in the:  [facility] [place]    You were discharged on:  February 6, 2018       Who to Call     For medical emergencies, please call 911.  For non-urgent questions about your medical care, please call your primary care provider or clinic, [phone]  For questions related to your surgery, please call your surgery clinic        Attending Provider     Provider Darion Fernando MD Ophthalmology       Primary Care Provider Office Phone # Fax #    Jessy Blackwell [phone]      Further instructions from your care team       [facility] Anesthesia [facility] Discharge  Instructions  Anesthesia (Eye Care [place])   Adult Discharge Instructions    For 24 hours after surgery    1. Get plenty of rest.  Make arrangements to have a responsible adult stay with you for at least 6 hours after you leave the hospital.  2. Do not drive or use heavy equipment for 24 hours.    3. Do not drink alcohol for 24 hours.  4. Do not sign legal documents or make important decisions for 24 hours.  5. Avoid strenuous or risky activities. You may feel lightheaded.  If so, sit for a few minutes before standing.  Have someone help you get up.   6. Conscious sedation patients may resume a regular diet..  7. Any questions of medical nature, call your physician.    Dr. DWYER  Macho  Post Operative DSEK Instructions      AFTER DSEK SURGERY INSTRUCTIONS    I. General Reminders  a. Continue any medication from your general medical doctor unless instructed otherwise.  b. Call immediately if you have any problems or questions.  II. Symptoms that often occur after surgery  a. Scratchiness  b. Foreign body sensation  III.  ALERT  symptoms - Call immediately should these occur ([phone])  a. Deep aching pain with headache and/or nausea  IV. Post-operative care  a. Lie flat in bed until your follow up visit the day after surgery.  b. You will be using eye drops following surgery.  The nurse will explain them and there will also be instructions on the bottles.  V. The first couple weeks following surgery.  a. You will wear a shield at bedtime until your doctor tells you to discontinue (usually around 2 weeks).  b. Minimize eye rubbing or heavy lifting for the first week following surgery.  c. No swimming, hot tubs, or whirlpools for 2 weeks.  Showers are ok after your follow up visit the day after surgery.  d. You will typically need to be seen for follow-up and post-operative care the day after surgery, at 2 weeks, 1-2 months, and 6 months following surgery.  You will likely need visits every 6 months to 1 year.  e. It is advised that you do not receive preventive flu or pneumonia shots after corneal transplant procedures as this could lead to increased risk of corneal rejection.  VI. Additional Postoperative Instructions  a. Although you will be awake and alert in the recovery room, small amounts of anesthetic will remain in your body for at least 24 hours and you may feel tired and sleepy for the remainder of the day.  You may get up to use the restroom and for meals.  It is advisable to have someone with you at home for the remainder of the day.  b. Drink plenty of fluids.  Alcoholic beverages should be avoided for 24 hours after your anesthesia or intravenous sedation.  c. Prolonged  "nausea, vomiting, loss of vision, discharge (other than tearing), or severe pain should be reported to your doctor.  Some discomfort in the operative eye the day of surgery is normal.        Pending Results     No orders found from 2018 to 2018.            Admission Information     Date & Time Provider Department Dept. Phone    2018 Darion Pritchard MD [facility] [phone]      Your Vitals Were     Blood Pressure Pulse Temperature Respirations Height Weight    137/86 75 97  F (36.1  C) (Temporal) 16 1.626 m (5' 4\") 89.4 kg (197 lb)    Pulse Oximetry BMI (Body Mass Index)                100% 33.81 kg/m2          MyChart Information     [employer] lets you send messages to your doctor, view your test results, renew your prescriptions, schedule appointments and more. To sign up, go to [email or web address] . Click on \"Log in\" on the left side of the screen, which will take you to the Welcome page. Then click on \"Sign up Now\" on the right side of the page.     You will be asked to enter the access code listed below, as well as some personal information. Please follow the directions to create your username and password.     Your access code is: [email or web address]  Expires: 3/21/2018  4:11 PM     Your access code will  in 90 days. If you need help or a new code, please call your [facility] clinic or [phone].        Care EveryWhere ID     This is your Care EveryWhere ID. This could be used by other organizations to access your [facility] medical records  [id number]        Equal Access to Services     [facility] HospitalBEATRICE : Yarelis So, saurav randall, ramon bourne. So [facility] [phone].    ATENCIÓN: Si habla español, tiene a rey disposición servicios gratuitos de asistencia lingüística. Llame al [phone].    We comply with applicable federal civil rights laws and [place] laws. We do not discriminate on the basis of " race, color, national origin, age, disability, sex, sexual orientation, or gender identity.               Review of your medicines      CONTINUE these medicines which have NOT CHANGED        Dose / Directions    ATORVASTATIN CALCIUM PO        Dose:  40 mg   Take 40 mg by mouth daily   Refills:  0       Biotin 10866 MCG Tabs        Dose:  1 tablet   Take 1 tablet by mouth daily   Refills:  0       CLOPIDOGREL BISULFATE PO        Dose:  75 mg   Take 75 mg by mouth daily   Refills:  0       GLIPIZIDE ER PO        Dose:  10 mg   Take 10 mg by mouth 2 times daily   Refills:  0       JANUVIA PO        Dose:  100 mg   Take 100 mg by mouth daily   Refills:  0       LISINOPRIL PO        Dose:  40 mg   Take 40 mg by mouth daily   Refills:  0       MAPAP PO        Dose:  500 mg   Take 500 mg by mouth once as needed for mild pain or fever   Refills:  0       menthol-zinc oxide 0.44-20.625 % Oint ointment   Commonly known as:  CALMOSEPTINE        Apply topically 3 times daily   Refills:  0       METFORMIN HCL PO        Dose:  1000 mg   Take 1,000 mg by mouth 2 times daily (with meals)   Refills:  0       METOPROLOL TARTRATE PO        Dose:  50 mg   Take 50 mg by mouth 2 times daily   Refills:  0       SERTRALINE HCL PO        Dose:  100 mg   Take 100 mg by mouth daily Take one and one half tablets daily   Refills:  0       sodium chloride 5 % ophthalmic solution   Commonly known as:  ROBERTA 128        Dose:  1 drop   1 drop 4 times daily as needed for dry eyes   Refills:  0       TRAZODONE HCL PO        Dose:  50 mg   Take 50 mg by mouth nightly as needed for sleep   Refills:  0       VITAMIN D (CHOLECALCIFEROL) PO        Dose:  2000 Units   Take 2,000 Units by mouth daily   Refills:  0                Protect others around you: Learn how to safely use, store and throw away your medicines at www.disposemymeds.org.             Medication List: This is a list of all your medications and when to take them. Check marks below indicate  your daily home schedule. Keep this list as a reference.      Medications           Morning Afternoon Evening Bedtime As Needed    ATORVASTATIN CALCIUM PO   Take 40 mg by mouth daily                                Biotin 92651 MCG Tabs   Take 1 tablet by mouth daily                                CLOPIDOGREL BISULFATE PO   Take 75 mg by mouth daily                                GLIPIZIDE ER PO   Take 10 mg by mouth 2 times daily                                JANUVIA PO   Take 100 mg by mouth daily                                LISINOPRIL PO   Take 40 mg by mouth daily                                MAPAP PO   Take 500 mg by mouth once as needed for mild pain or fever                                menthol-zinc oxide 0.44-20.625 % Oint ointment   Commonly known as:  CALMOSEPTINE   Apply topically 3 times daily                                METFORMIN HCL PO   Take 1,000 mg by mouth 2 times daily (with meals)                                METOPROLOL TARTRATE PO   Take 50 mg by mouth 2 times daily                                SERTRALINE HCL PO   Take 100 mg by mouth daily Take one and one half tablets daily                                sodium chloride 5 % ophthalmic solution   Commonly known as:  ROBERTA 128   1 drop 4 times daily as needed for dry eyes                                TRAZODONE HCL PO   Take 50 mg by mouth nightly as needed for sleep                                VITAMIN D (CHOLECALCIFEROL) PO   Take 2,000 Units by mouth daily

## 2021-07-22 PROCEDURE — 88305 TISSUE EXAM BY PATHOLOGIST: CPT | Performed by: PATHOLOGY

## 2021-07-22 PROCEDURE — 88305 TISSUE EXAM BY PATHOLOGIST: CPT | Mod: TC,ORL | Performed by: INTERNAL MEDICINE

## 2021-07-22 PROCEDURE — 88305 TISSUE EXAM BY PATHOLOGIST: CPT | Mod: 26 | Performed by: PATHOLOGY

## 2021-07-23 ENCOUNTER — LAB REQUISITION (OUTPATIENT)
Dept: LAB | Facility: CLINIC | Age: 61
End: 2021-07-23
Payer: COMMERCIAL

## 2021-07-23 DIAGNOSIS — R11.2 NAUSEA WITH VOMITING, UNSPECIFIED: ICD-10-CM

## 2021-07-26 LAB
PATH REPORT.COMMENTS IMP SPEC: NORMAL
PATH REPORT.COMMENTS IMP SPEC: NORMAL
PATH REPORT.FINAL DX SPEC: NORMAL
PATH REPORT.GROSS SPEC: NORMAL
PATH REPORT.MICROSCOPIC SPEC OTHER STN: NORMAL
PATH REPORT.RELEVANT HX SPEC: NORMAL
PHOTO IMAGE: NORMAL

## (undated) DEVICE — EYE PACK CUSTOM ANTERIOR 30DEG TIP CENTURION PPK6682-04

## (undated) DEVICE — LINEN TOWEL PACK X5 5464

## (undated) DEVICE — EYE SOL BSS 500ML

## (undated) DEVICE — SU ETHILON 10-0 CS160-6 12" 9000G

## (undated) DEVICE — GOWN LG DISP 9515

## (undated) DEVICE — SEALANT OCULAR RESURE RS-1004-US-10

## (undated) DEVICE — EYE KNIFE STAB 15DEG PE3015

## (undated) DEVICE — EYE ROLLER LASIK FLAP 14MM SLV 585233

## (undated) DEVICE — SYR 10ML SLIP TIP W/O NDL

## (undated) DEVICE — ENDOSERTER DELIVERY SYSTEM ES1

## (undated) DEVICE — GLOVE PROTEXIS MICRO 6.5  2D73PM65

## (undated) DEVICE — GLOVE PROTEXIS MICRO 7.5  2D73PM75

## (undated) DEVICE — Device

## (undated) DEVICE — EYE MARKING PAD 581057

## (undated) DEVICE — NDL 22GA 1.5"

## (undated) DEVICE — PACK CATARACT CUSTOM SO DALE SEY32CTFCX

## (undated) DEVICE — EYE PACK BVI READYPAK KIT #1

## (undated) DEVICE — SYR 01ML 25GA 5/8" TBC

## (undated) DEVICE — GLOVE PROTEXIS MICRO 7.0  2D73PM70

## (undated) DEVICE — EYE KNIFE SLIT 3.0MM OASIS PE4830-TL

## (undated) DEVICE — EYE CANN IRR 20GA ACM LEWICKY 585061

## (undated) DEVICE — EYE CANN IRR 30GA  ANTERIOR CHAMBER 581273

## (undated) DEVICE — EYE SHIELD PLASTIC

## (undated) DEVICE — EYE NDL RETROBULBAR 25GA 1.5" 581275

## (undated) DEVICE — EYE PUNCH VACUUM BARRON 8.0MM K20-2108

## (undated) DEVICE — EYE DRSG PAD OVAL

## (undated) DEVICE — EYE KNIFE SLIT XSTAR VISITEC 2.4MM 45DEG BEVEL UP 373724

## (undated) RX ORDER — TRIAMCINOLONE ACETONIDE 40 MG/ML
INJECTION, SUSPENSION INTRA-ARTICULAR; INTRAMUSCULAR
Status: DISPENSED
Start: 2018-02-06

## (undated) RX ORDER — CEFTAZIDIME 1 G/1
INJECTION, POWDER, FOR SOLUTION INTRAMUSCULAR; INTRAVENOUS
Status: DISPENSED
Start: 2017-12-21

## (undated) RX ORDER — LIDOCAINE HYDROCHLORIDE 10 MG/ML
INJECTION, SOLUTION EPIDURAL; INFILTRATION; INTRACAUDAL; PERINEURAL
Status: DISPENSED
Start: 2017-12-21

## (undated) RX ORDER — PILOCARPINE HYDROCHLORIDE 10 MG/ML
SOLUTION/ DROPS OPHTHALMIC
Status: DISPENSED
Start: 2017-12-21

## (undated) RX ORDER — EPINEPHRINE 1 MG/ML
INJECTION, SOLUTION, CONCENTRATE INTRAVENOUS
Status: DISPENSED
Start: 2017-12-21

## (undated) RX ORDER — TRIAMCINOLONE ACETONIDE 40 MG/ML
INJECTION, SUSPENSION INTRA-ARTICULAR; INTRAMUSCULAR
Status: DISPENSED
Start: 2017-12-21

## (undated) RX ORDER — PROPOFOL 10 MG/ML
INJECTION, EMULSION INTRAVENOUS
Status: DISPENSED
Start: 2017-12-21

## (undated) RX ORDER — ONDANSETRON 2 MG/ML
INJECTION INTRAMUSCULAR; INTRAVENOUS
Status: DISPENSED
Start: 2018-02-06

## (undated) RX ORDER — EPINEPHRINE 1 MG/ML
INJECTION, SOLUTION, CONCENTRATE INTRAVENOUS
Status: DISPENSED
Start: 2018-02-06

## (undated) RX ORDER — WATER 10 ML/10ML
INJECTION INTRAMUSCULAR; INTRAVENOUS; SUBCUTANEOUS
Status: DISPENSED
Start: 2018-02-06

## (undated) RX ORDER — CEFTAZIDIME 1 G/1
INJECTION, POWDER, FOR SOLUTION INTRAMUSCULAR; INTRAVENOUS
Status: DISPENSED
Start: 2018-02-06

## (undated) RX ORDER — GLYCOPYRROLATE 0.2 MG/ML
INJECTION, SOLUTION INTRAMUSCULAR; INTRAVENOUS
Status: DISPENSED
Start: 2017-12-21

## (undated) RX ORDER — FENTANYL CITRATE 50 UG/ML
INJECTION, SOLUTION INTRAMUSCULAR; INTRAVENOUS
Status: DISPENSED
Start: 2018-02-06